# Patient Record
Sex: MALE | Race: WHITE | NOT HISPANIC OR LATINO | Employment: STUDENT | URBAN - METROPOLITAN AREA
[De-identification: names, ages, dates, MRNs, and addresses within clinical notes are randomized per-mention and may not be internally consistent; named-entity substitution may affect disease eponyms.]

---

## 2021-02-08 ENCOUNTER — APPOINTMENT (OUTPATIENT)
Dept: GENERAL RADIOLOGY | Facility: HOSPITAL | Age: 41
End: 2021-02-08

## 2021-02-08 ENCOUNTER — HOSPITAL ENCOUNTER (EMERGENCY)
Facility: HOSPITAL | Age: 41
Discharge: HOME | End: 2021-02-08
Attending: EMERGENCY MEDICINE | Admitting: EMERGENCY MEDICINE

## 2021-02-08 VITALS
TEMPERATURE: 98.8 F | SYSTOLIC BLOOD PRESSURE: 118 MMHG | HEART RATE: 69 BPM | DIASTOLIC BLOOD PRESSURE: 74 MMHG | RESPIRATION RATE: 18 BRPM | HEIGHT: 68 IN | WEIGHT: 169 LBS | BODY MASS INDEX: 25.61 KG/M2 | OXYGEN SATURATION: 98 %

## 2021-02-08 DIAGNOSIS — S61.319A LACERATION OF NAIL BED OF FINGER, INITIAL ENCOUNTER: ICD-10-CM

## 2021-02-08 DIAGNOSIS — S61.309A NAIL AVULSION, FINGER, INITIAL ENCOUNTER: ICD-10-CM

## 2021-02-08 DIAGNOSIS — S61.311A LACERATION OF LEFT INDEX FINGER WITHOUT FOREIGN BODY WITH DAMAGE TO NAIL, INITIAL ENCOUNTER: Primary | ICD-10-CM

## 2021-02-08 PROCEDURE — 99282 EMERGENCY DEPT VISIT SF MDM: CPT

## 2021-02-08 PROCEDURE — 73140 X-RAY EXAM OF FINGER(S): CPT

## 2021-02-08 PROCEDURE — 11730 AVULSION NAIL PLATE SIMPLE 1: CPT | Performed by: PHYSICIAN ASSISTANT

## 2021-02-08 PROCEDURE — 12002 RPR S/N/AX/GEN/TRNK2.6-7.5CM: CPT | Performed by: PHYSICIAN ASSISTANT

## 2021-02-08 RX ORDER — LIDOCAINE HYDROCHLORIDE 20 MG/ML
10 INJECTION, SOLUTION INFILTRATION; PERINEURAL ONCE
Status: COMPLETED | OUTPATIENT
Start: 2021-02-08 | End: 2021-02-08

## 2021-02-08 RX ORDER — CEPHALEXIN 500 MG/1
500 CAPSULE ORAL 3 TIMES DAILY
Qty: 21 CAPSULE | Refills: 0 | Status: SHIPPED | OUTPATIENT
Start: 2021-02-08 | End: 2021-02-15

## 2021-02-08 RX ORDER — OMEPRAZOLE 20 MG/1
20 CAPSULE, DELAYED RELEASE ORAL DAILY
COMMUNITY

## 2021-02-08 RX ORDER — BUPIVACAINE HYDROCHLORIDE 5 MG/ML
10 INJECTION, SOLUTION EPIDURAL; INTRACAUDAL ONCE
Status: COMPLETED | OUTPATIENT
Start: 2021-02-08 | End: 2021-02-08

## 2021-02-08 RX ADMIN — LIDOCAINE HYDROCHLORIDE 10 ML: 20 INJECTION, SOLUTION INFILTRATION; PERINEURAL at 11:32

## 2021-02-08 RX ADMIN — BUPIVACAINE HYDROCHLORIDE 10 ML: 5 INJECTION, SOLUTION EPIDURAL; INTRACAUDAL; PERINEURAL at 11:31

## 2021-02-08 NOTE — ED TRIAGE NOTES
Pt arrives to Ed room 8 ambulatory from home with complaint of L 2nd digit crush injury via hammer while he was working on cabinets.

## 2021-02-08 NOTE — DISCHARGE INSTRUCTIONS
Return to the emergency department with worsening symptoms, or as needed with emergent concerns.    Please keep bulky dressing in place for 24 to 48 hours.  Upon removal of dressing gently cleanse area with soap and water, pat dry with sterile gauze.  Apply bacitracin and nonadherent dressing to nail bed, cover with gauze and wrap with Coban.  Please follow-up with [facility] for reevaluation of wound and for suture removal in 7 to 10 days.  Take Keflex as directed to help prevent infection.  If any signs of infection occur such as redness, drainage, or swelling please return to the emergency department.

## 2021-02-08 NOTE — ED PROVIDER NOTES
EMERGENCY DEPARTMENT ENCOUNTER      Room Number: 08/08    History is provided by the patient, no translation services needed    HPI:    Chief complaint: Finger injury    Location: Left index finger    Quality/Severity: 5/10, throbbing    Timing/Duration: Approximately half hour prior to arrival    Modifying Factors: Patient states he applied pressure to finger in route to ED.    Associated Symptoms: Positive for pain and laceration in left index finger.    Narrative: Pt is a 40 y.o. right-handed male who presents complaining of injury to left index finger.  Patient states he was working on some cabinets and accidentally hit his left index finger with a hammer.  He states his tetanus is probably not up-to-date.  He denies any numbness in the finger.    PMD: Provider, No Known    REVIEW OF SYSTEMS  Review of Systems   Constitutional: Negative for chills and fever.   Respiratory: Negative for cough and shortness of breath.    Cardiovascular: Negative for chest pain and palpitations.   Musculoskeletal: Positive for arthralgias. Negative for joint swelling.   Skin: Positive for wound. Negative for pallor.   Neurological: Negative for dizziness, syncope and numbness.   Psychiatric/Behavioral: Negative for confusion. The patient is not nervous/anxious.          PAST MEDICAL HISTORY  Active Ambulatory Problems     Diagnosis Date Noted   • No Active Ambulatory Problems     Resolved Ambulatory Problems     Diagnosis Date Noted   • No Resolved Ambulatory Problems     No Additional Past Medical History       PAST SURGICAL HISTORY  No past surgical history on file.    FAMILY HISTORY  History reviewed. No pertinent family history.    SOCIAL HISTORY  Social History     Socioeconomic History   • Marital status:      Spouse name: Not on file   • Number of children: Not on file   • Years of education: Not on file   • Highest education level: Not on file       ALLERGIES  Patient has no known allergies.    No current  facility-administered medications for this encounter.     Current Outpatient Medications:   •  omeprazole (priLOSEC) 20 MG capsule, Take 20 mg by mouth Daily., Disp: , Rfl:   •  cephalexin (KEFLEX) 500 MG capsule, Take 1 capsule by mouth 3 (Three) Times a Day for 7 days., Disp: 21 capsule, Rfl: 0    PHYSICAL EXAM  ED Triage Vitals [02/08/21 1003]   Temp Heart Rate Resp BP SpO2   98.8 °F (37.1 °C) 69 18 118/74 98 %      Temp src Heart Rate Source Patient Position BP Location FiO2 (%)   Oral Monitor Sitting Right arm --       Physical Exam   Constitutional: He is oriented to person, place, and time and well-developed, well-nourished, and in no distress.   HENT:   Head: Normocephalic and atraumatic.   Eyes: Pupils are equal, round, and reactive to light. Conjunctivae are normal.   Cardiovascular: Normal rate, regular rhythm and intact distal pulses.   Pulmonary/Chest: Effort normal. No respiratory distress.   Musculoskeletal: Normal range of motion.         General: No edema.      Left hand: He exhibits tenderness and laceration (Patient appears to have laceration of fingertip of left index finger, probably extending into the nailbed.  His nail is avulsed at the proximal aspect and is still attached to the distal aspect of his nail bed.). He exhibits normal range of motion and normal capillary refill. Normal sensation noted.   Neurological: He is alert and oriented to person, place, and time. GCS score is 15.   Skin: Skin is warm and dry.   Psychiatric: Mood, memory, affect and judgment normal.   Nursing note and vitals reviewed.        LAB RESULTS  Lab Results (last 24 hours)     ** No results found for the last 24 hours. **            I ordered the above labs and reviewed the results    RADIOLOGY  Xr Finger 2+ View Left    Result Date: 2/8/2021  XR FINGER 2+ VW LEFT-: 2/8/2021 10:32 AM  INDICATION: Pain in the tip of the second digit. Injured with a hammer today. Pain and swelling..  COMPARISON: None available.   FINDINGS: 3 views of the left second finger.  No fracture or dislocation.  No bone erosion or destruction.   No foreign body. Soft tissue laceration injury of the distal phalanx along with soft tissue swelling.       1. Soft tissue swelling and laceration injury. No acute fracture.  This report was finalized on 2/8/2021 10:46 AM by Dr. Moisés Montoya MD.        I ordered the above radiologic testing and reviewed the results    PROCEDURES  Procedures    Laceration repair:  Discussed risks to include bleeding, infection, further tissue damage, benefits to include improved wound healing, and alternatives to include no closure, altered closure techniques with patient/parents of the patient/guardian.  Expressed verbal consent for procedure.  3 cm laceration located at tip of left index finger.  Laceration extends from the nailbed into the radial side of the finger tip.  Digital block performed with 50-50 mixture of 0.5% Marcaine and 2% lidocaine without epi, finger then soaked in Betadine and sterile saline mixture for 10 minutes.  Finger prepped with Betadine, and irrigated copiously.  Wound explored.  Nail was carefully dissected from the distal aspect and removed, it had already avulsed at the proximal aspect.  Repair of nailbed performed with 5-0 Vicryl in an interrupted fashion requiring 3 sutures.  Skin closed with 5-0 nylon in an interrupted fashion requiring 3 sutures.  Turnicot applied prior to procedure and removed following procedure.  Sterile dressing placed with bacitracin, Adaptic, Telfa, and bulky dressing with 4 x 4 and Coban applied.  Well-tolerated.  No immediate complications identified.  Reviewed wound care, follow-up for suture removal, and red flags which would indicate need for reevaluation of the wound.      PROGRESS AND CONSULTS  ED Course as of Feb 08 1615   Mon Feb 08, 2021   1320 Results of imaging discussed with patient, fortunately he does not have any underlying fracture with this injury.   He did have an avulsion of the nail and laceration of the nailbed.  Please see procedure note for details.  Patient declined his tetanus booster here today.  I have discussed wound care and need for follow-up with hand surgery for reevaluation of wound and further treatment as indicated.  He will be placed on Keflex 3 times daily x7 days for infection prophylaxis.  Patient verbalizes understanding and is agreeable with discharge home at this time.    [KS]      ED Course User Index  [KS] Desi Blanca PA-C           MEDICAL DECISION MAKING  Results were reviewed/discussed with the patient and they were also made aware of online access. Pt also made aware that some labs, such as cultures, will not be resulted during ER visit and follow up with PMD is necessary.     MDM        My differential diagnosis of the upper extremity includes but is not limited to contusions of the shoulder, forearm, arm, wrist, elbow or hand, dislocations of shoulder, elbow, wrist, digits, shoulder sprain, elbow sprain, wrist sprain, digit sprain, shoulder strain, arm strain, forearm strain, elbow strain, wrist strain, hand sprain, digit strain, lacerations of the upper extremity, fractures both closed and open of radius, ulna and humerus, cellulitis or abscess, cervical radiculopathy, radial nerve palsy, neurogenic upper extremity pain.      DIAGNOSIS  Final diagnoses:   Laceration of left index finger without foreign body with damage to nail, initial encounter   Laceration of nail bed of finger, initial encounter   Nail avulsion, finger, initial encounter       Latest Documented Vital Signs:  As of 16:15 EST  BP- 118/74 HR- 69 Temp- 98.8 °F (37.1 °C) (Oral) O2 sat- 98%    DISPOSITION  Patient discharged home in care of his wife.    Discussed pertinent imaging findings with the patient/family.  Patient/Family voiced understanding of need to follow-up for recheck, further testing as needed.  Return to the emergency Department warnings  were given.         Medication List      New Prescriptions    cephalexin 500 MG capsule  Commonly known as: KEFLEX  Take 1 capsule by mouth 3 (Three) Times a Day for 7 days.           Where to Get Your Medications      These medications were sent to [id number] - [place], [place] - [address] - [phone]  - [phone] FX  [address], [place] [address]    Phone: [phone]   · cephalexin 500 MG capsule             Follow-up Information     [facility].    Why: To schedule follow-up appointment, For wound re-check and suture removal in 7 to 10 days  Contact information:  [address]  [place] [id number] [phone]                   Dictated utilizing Dragon dictation     Desi Blanca PA-C  02/08/21 [id number]

## 2021-12-15 ENCOUNTER — APPOINTMENT (OUTPATIENT)
Dept: GENERAL RADIOLOGY | Facility: HOSPITAL | Age: 41
End: 2021-12-15

## 2021-12-15 PROCEDURE — 73552 X-RAY EXAM OF FEMUR 2/>: CPT | Performed by: FAMILY MEDICINE
